# Patient Record
Sex: FEMALE | Race: WHITE | NOT HISPANIC OR LATINO | Employment: FULL TIME | URBAN - METROPOLITAN AREA
[De-identification: names, ages, dates, MRNs, and addresses within clinical notes are randomized per-mention and may not be internally consistent; named-entity substitution may affect disease eponyms.]

---

## 2024-08-30 ENCOUNTER — APPOINTMENT (OUTPATIENT)
Dept: CT IMAGING | Facility: CLINIC | Age: 44
End: 2024-08-30
Attending: EMERGENCY MEDICINE
Payer: COMMERCIAL

## 2024-08-30 ENCOUNTER — HOSPITAL ENCOUNTER (EMERGENCY)
Facility: CLINIC | Age: 44
Discharge: HOME | End: 2024-08-30
Attending: EMERGENCY MEDICINE | Admitting: EMERGENCY MEDICINE
Payer: COMMERCIAL

## 2024-08-30 VITALS
DIASTOLIC BLOOD PRESSURE: 87 MMHG | TEMPERATURE: 98 F | WEIGHT: 134 LBS | HEART RATE: 81 BPM | SYSTOLIC BLOOD PRESSURE: 129 MMHG | OXYGEN SATURATION: 98 % | RESPIRATION RATE: 16 BRPM

## 2024-08-30 DIAGNOSIS — R10.9 FLANK PAIN: ICD-10-CM

## 2024-08-30 DIAGNOSIS — E87.1 HYPONATREMIA: ICD-10-CM

## 2024-08-30 LAB
ALBUMIN SERPL BCG-MCNC: 4.3 G/DL (ref 3.5–5.2)
ALBUMIN UR-MCNC: NEGATIVE MG/DL
ALP SERPL-CCNC: 38 U/L (ref 40–150)
ALT SERPL W P-5'-P-CCNC: 14 U/L (ref 0–50)
ANION GAP SERPL CALCULATED.3IONS-SCNC: 11 MMOL/L (ref 7–15)
APPEARANCE UR: CLEAR
AST SERPL W P-5'-P-CCNC: 21 U/L (ref 0–45)
BACTERIA #/AREA URNS HPF: ABNORMAL /HPF
BASOPHILS # BLD AUTO: 0 10E3/UL (ref 0–0.2)
BASOPHILS NFR BLD AUTO: 0 %
BILIRUB SERPL-MCNC: 0.3 MG/DL
BILIRUB UR QL STRIP: NEGATIVE
BUN SERPL-MCNC: 19.1 MG/DL (ref 6–20)
CALCIUM SERPL-MCNC: 8.9 MG/DL (ref 8.8–10.4)
CHLORIDE SERPL-SCNC: 100 MMOL/L (ref 98–107)
COLOR UR AUTO: YELLOW
CREAT SERPL-MCNC: 0.67 MG/DL (ref 0.51–0.95)
D DIMER PPP FEU-MCNC: 0.29 UG/ML FEU (ref 0–0.5)
EGFRCR SERPLBLD CKD-EPI 2021: >90 ML/MIN/1.73M2
EOSINOPHIL # BLD AUTO: 0.1 10E3/UL (ref 0–0.7)
EOSINOPHIL NFR BLD AUTO: 1 %
ERYTHROCYTE [DISTWIDTH] IN BLOOD BY AUTOMATED COUNT: 12.9 % (ref 10–15)
GLUCOSE SERPL-MCNC: 98 MG/DL (ref 70–99)
GLUCOSE UR STRIP-MCNC: NEGATIVE MG/DL
HCG SERPL QL: NEGATIVE
HCO3 SERPL-SCNC: 23 MMOL/L (ref 22–29)
HCT VFR BLD AUTO: 35.4 % (ref 35–47)
HGB BLD-MCNC: 12 G/DL (ref 11.7–15.7)
HGB UR QL STRIP: NEGATIVE
IMM GRANULOCYTES # BLD: 0 10E3/UL
IMM GRANULOCYTES NFR BLD: 0 %
KETONES UR STRIP-MCNC: 20 MG/DL
LEUKOCYTE ESTERASE UR QL STRIP: NEGATIVE
LIPASE SERPL-CCNC: 30 U/L (ref 13–60)
LYMPHOCYTES # BLD AUTO: 1.4 10E3/UL (ref 0.8–5.3)
LYMPHOCYTES NFR BLD AUTO: 12 %
MCH RBC QN AUTO: 31.3 PG (ref 26.5–33)
MCHC RBC AUTO-ENTMCNC: 33.9 G/DL (ref 31.5–36.5)
MCV RBC AUTO: 92 FL (ref 78–100)
MONOCYTES # BLD AUTO: 1 10E3/UL (ref 0–1.3)
MONOCYTES NFR BLD AUTO: 9 %
MUCOUS THREADS #/AREA URNS LPF: PRESENT /LPF
NEUTROPHILS # BLD AUTO: 9 10E3/UL (ref 1.6–8.3)
NEUTROPHILS NFR BLD AUTO: 78 %
NITRATE UR QL: NEGATIVE
NRBC # BLD AUTO: 0 10E3/UL
NRBC BLD AUTO-RTO: 0 /100
PH UR STRIP: 6.5 [PH] (ref 5–7)
PLATELET # BLD AUTO: 268 10E3/UL (ref 150–450)
POTASSIUM SERPL-SCNC: 3.8 MMOL/L (ref 3.4–5.3)
PROT SERPL-MCNC: 6.9 G/DL (ref 6.4–8.3)
RBC # BLD AUTO: 3.84 10E6/UL (ref 3.8–5.2)
RBC URINE: 1 /HPF
SODIUM SERPL-SCNC: 134 MMOL/L (ref 135–145)
SP GR UR STRIP: 1.03 (ref 1–1.03)
SQUAMOUS EPITHELIAL: 2 /HPF
UROBILINOGEN UR STRIP-MCNC: NORMAL MG/DL
WBC # BLD AUTO: 11.5 10E3/UL (ref 4–11)
WBC URINE: 2 /HPF

## 2024-08-30 PROCEDURE — 84703 CHORIONIC GONADOTROPIN ASSAY: CPT | Performed by: EMERGENCY MEDICINE

## 2024-08-30 PROCEDURE — 74177 CT ABD & PELVIS W/CONTRAST: CPT

## 2024-08-30 PROCEDURE — 250N000011 HC RX IP 250 OP 636: Performed by: EMERGENCY MEDICINE

## 2024-08-30 PROCEDURE — 250N000009 HC RX 250: Performed by: EMERGENCY MEDICINE

## 2024-08-30 PROCEDURE — 36415 COLL VENOUS BLD VENIPUNCTURE: CPT | Performed by: EMERGENCY MEDICINE

## 2024-08-30 PROCEDURE — 85379 FIBRIN DEGRADATION QUANT: CPT | Performed by: EMERGENCY MEDICINE

## 2024-08-30 PROCEDURE — 83690 ASSAY OF LIPASE: CPT | Performed by: EMERGENCY MEDICINE

## 2024-08-30 PROCEDURE — 99285 EMERGENCY DEPT VISIT HI MDM: CPT | Mod: 25

## 2024-08-30 PROCEDURE — 80053 COMPREHEN METABOLIC PANEL: CPT | Performed by: EMERGENCY MEDICINE

## 2024-08-30 PROCEDURE — 85025 COMPLETE CBC W/AUTO DIFF WBC: CPT | Performed by: EMERGENCY MEDICINE

## 2024-08-30 PROCEDURE — 81001 URINALYSIS AUTO W/SCOPE: CPT | Performed by: EMERGENCY MEDICINE

## 2024-08-30 RX ORDER — IOPAMIDOL 755 MG/ML
68 INJECTION, SOLUTION INTRAVASCULAR ONCE
Status: COMPLETED | OUTPATIENT
Start: 2024-08-30 | End: 2024-08-30

## 2024-08-30 RX ADMIN — IOPAMIDOL 68 ML: 755 INJECTION, SOLUTION INTRAVENOUS at 05:13

## 2024-08-30 RX ADMIN — SODIUM CHLORIDE 60 ML: 9 INJECTION, SOLUTION INTRAVENOUS at 05:13

## 2024-08-30 ASSESSMENT — ACTIVITIES OF DAILY LIVING (ADL)
ADLS_ACUITY_SCORE: 35

## 2024-08-30 ASSESSMENT — COLUMBIA-SUICIDE SEVERITY RATING SCALE - C-SSRS
1. IN THE PAST MONTH, HAVE YOU WISHED YOU WERE DEAD OR WISHED YOU COULD GO TO SLEEP AND NOT WAKE UP?: NO
6. HAVE YOU EVER DONE ANYTHING, STARTED TO DO ANYTHING, OR PREPARED TO DO ANYTHING TO END YOUR LIFE?: NO
2. HAVE YOU ACTUALLY HAD ANY THOUGHTS OF KILLING YOURSELF IN THE PAST MONTH?: NO

## 2024-08-30 NOTE — ED PROVIDER NOTES
Emergency Department Note      History of Present Illness     Chief Complaint   Flank Pain      HPI   Charo Hassan is a 44 year old female presenting to the emergency department for evaluation of flank pain. The patient reports continuous  right sided flank pain for the past 5 days after doing house work at her parents house. She reports that she was prescribed muscle relaxers during an office visit, but states that the muscle relaxers do not provide her with much relief. She notes her pain is worse when lying down and movement. She denies any nausea, vomiting, diarrhea, dysuria, hematuria, polyuria, or changes in her bowel movements. No leg pain or leg swelling. She denies any shortness of breath, but reports pain in her right flank upon taking a deep breath. She denies any abdominal surgeries in the past 10 years. She denies any recent illness, but notes that she traveled to [facility] in June. She reports she took a dose of her muscle relaxer and Ibuprofen at 2120 this evening.     Independent Historian   None    Review of External Notes   I reviewed a note from the patient's office visit on 08/28/24, where the patient was seen for right sided flank pain, and prescribed a muscle relaxer, and Ibuprofen.       Past Medical History     Medical History and Problem List   Migraine   Anxiety   Atypical nevus   Herpes labialis  Fibroid uterus     Medications   Aldactone   Valtrex  Prozac  Clindamycin  Imitrex   Mirena     Surgical History   Hysteroscopy, laparoscopy, tubal dye     Physical Exam     Patient Vitals for the past 24 hrs:   BP Temp Temp src Pulse Resp SpO2 Weight   08/30/24 0142 127/80 98  F (36.7  C) Oral 70 18 98 % 60.8 kg (134 lb)     Physical Exam  General: Sitting up in bed  Eyes:  The pupils are equal and round    Conjunctivae and sclerae are normal  ENT:    Atraumatic face  Neck:  Normal range of motion  CV:  Regular rate, regular rhythm     Skin warm and well perfused   Resp:  Non labored breathing  on room air    No tachypnea    No cough heard    Lungs clear bilaterally  GI:  Abdomen is soft, there is no rigidity    No distension    No rebound tenderness     No abdominal tenderness  MS:  Minimal tenderness on right flank  Skin:  No rash or acute skin lesions noted on back  Neuro:   Awake, alert.      Speech is normal and fluent.    Face is symmetric.     Moves all extremities equally  Psych: Normal affect.  Appropriate interactions.     Diagnostics     Lab Results   Labs Ordered and Resulted from Time of ED Arrival to Time of ED Departure   COMPREHENSIVE METABOLIC PANEL - Abnormal       Result Value    Sodium 134 (*)     Potassium 3.8      Carbon Dioxide (CO2) 23      Anion Gap 11      Urea Nitrogen 19.1      Creatinine 0.67      GFR Estimate >90      Calcium 8.9      Chloride 100      Glucose 98      Alkaline Phosphatase 38 (*)     AST 21      ALT 14      Protein Total 6.9      Albumin 4.3      Bilirubin Total 0.3     ROUTINE UA WITH MICROSCOPIC REFLEX TO CULTURE - Abnormal    Color Urine Yellow      Appearance Urine Clear      Glucose Urine Negative      Bilirubin Urine Negative      Ketones Urine 20 (*)     Specific Gravity Urine 1.026      Blood Urine Negative      pH Urine 6.5      Protein Albumin Urine Negative      Urobilinogen Urine Normal      Nitrite Urine Negative      Leukocyte Esterase Urine Negative      Bacteria Urine Few (*)     Mucus Urine Present (*)     RBC Urine 1      WBC Urine 2      Squamous Epithelials Urine 2 (*)    CBC WITH PLATELETS AND DIFFERENTIAL - Abnormal    WBC Count 11.5 (*)     RBC Count 3.84      Hemoglobin 12.0      Hematocrit 35.4      MCV 92      MCH 31.3      MCHC 33.9      RDW 12.9      Platelet Count 268      % Neutrophils 78      % Lymphocytes 12      % Monocytes 9      % Eosinophils 1      % Basophils 0      % Immature Granulocytes 0      NRBCs per 100 WBC 0      Absolute Neutrophils 9.0 (*)     Absolute Lymphocytes 1.4      Absolute Monocytes 1.0      Absolute  Eosinophils 0.1      Absolute Basophils 0.0      Absolute Immature Granulocytes 0.0      Absolute NRBCs 0.0     HCG QUALITATIVE PREGNANCY - Normal    hCG Serum Qualitative Negative     D DIMER QUANTITATIVE - Normal    D-Dimer Quantitative 0.29     LIPASE - Normal    Lipase 30         Imaging   CT Abdomen Pelvis w Contrast   Final Result   IMPRESSION:    1.  The appendix is not confidently demonstrated. No inflammatory changes are identified. Formed stool material within normal caliber colon without mechanical obstruction, free gas or free fluid.      2.  Retroverted heterogeneous fibroid uterus containing an IUCD, well seated.      3.  No urinary tract calculi or obstruction.                      ED Course      Medications Administered   Medications   iopamidol (ISOVUE-370) solution 68 mL (68 mLs Intravenous $Given 8/30/24 0513)   Saline Flush (60 mLs Intravenous $Given 8/30/24 0513)     Discussion of Management   None    ED Course   ED Course as of 08/30/24 0656   Fri Aug 30, 2024   0322 I obtained history and examined the patient as noted above.    0635 I reevaluated the patient and spoke to her regarding her laboratory and imaging results.        Additional Documentation  None    Medical Decision Making / Diagnosis     AYDEN Hassan is a 44 year old female who presents emergency department flank pain.  She points to the right flank as the area of her pain.  Possibly musculoskeletal pain given that she did do significant in the house or at her parents house preceding when her pain started.  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  Urinalysis not concerning for infection.  D-dimer was normal and she is low risk for PEs do not think further workup for PE is indicated.  She is not pregnant.  She is no right upper quadrant tenderness to suggest biliary colic or cholecystitis.  Her LFTs are normal.  Pain also does not worsen with  eating and has been able to eat normally so gallbladder pathology is unlikely.  No chest pain to suggest ACS.  CT abdomen pelvis obtained to rule out stone or other cause of pain.  No clear cause of pain found.  I doubt appendicitis given no right lower quadrant tenderness so even though appendix was not definitely seen, do not think that appendicitis is likely.  She has no midline spine tenderness to suggest disc herniation, spine fracture, epidural abscess and do not think imaging of her spine is indicated.  Patient improved in the emergency department and pain not as severe as what it was. she will continue NSAIDs.  She will try lidocaine patch. She also has muscle relaxers at home that she will use if needed.  Patient is allergic to the emergency department discussed with the patient.  She also has follow-up with her primary care provider in about 2 weeks and will follow up then if needed.    Disposition   The patient was discharged.     Diagnosis     ICD-10-CM    1. Flank pain  R10.9       2. Hyponatremia  E87.1          Scribe Disclosure:  I, Nakul Greenwood, am serving as a scribe at 3:41 AM on 8/30/2024 to document services personally performed by Kathrine Salazar MD based on my observations and the provider's statements to me.        Kathrine Salazar MD  08/30/24 1991

## 2024-08-30 NOTE — ED TRIAGE NOTES
Right sided flank pain since Sunday as she was doing a lot of physical work. Was seen by her doctor on Wednesday, they check her urine and blood and was told pain is muscular pain but tonight she has been unable to sleep despite taking the muscle relaxer.      Triage Assessment (Adult)       Row Name 08/30/24 0144          Triage Assessment    Airway WDL WDL        Respiratory WDL    Respiratory WDL WDL        Skin Circulation/Temperature WDL    Skin Circulation/Temperature WDL WDL        Cardiac WDL    Cardiac WDL WDL        Peripheral/Neurovascular WDL    Peripheral Neurovascular WDL WDL        Cognitive/Neuro/Behavioral WDL    Cognitive/Neuro/Behavioral WDL WDL

## 2024-08-30 NOTE — DISCHARGE INSTRUCTIONS
Continue NSAID like ibuprofen for anti-inflammatory  Muscle relaxer as needed  Lidocaine patches would be another thing to try  Physical therapy may be helpful if this continues  Follow-up with primary care provider  Watch for fever, difficulty breathing, pain now moving to the front/abdomen, vomiting